# Patient Record
Sex: MALE | Race: OTHER | NOT HISPANIC OR LATINO | URBAN - METROPOLITAN AREA
[De-identification: names, ages, dates, MRNs, and addresses within clinical notes are randomized per-mention and may not be internally consistent; named-entity substitution may affect disease eponyms.]

---

## 2019-05-28 ENCOUNTER — EMERGENCY (EMERGENCY)
Facility: HOSPITAL | Age: 54
LOS: 1 days | Discharge: HOME | End: 2019-05-28
Attending: EMERGENCY MEDICINE
Payer: SUBSIDIZED

## 2019-05-28 VITALS
DIASTOLIC BLOOD PRESSURE: 86 MMHG | RESPIRATION RATE: 17 BRPM | TEMPERATURE: 97 F | HEART RATE: 86 BPM | WEIGHT: 179.9 LBS | OXYGEN SATURATION: 99 % | SYSTOLIC BLOOD PRESSURE: 188 MMHG

## 2019-05-28 DIAGNOSIS — M79.602 PAIN IN LEFT ARM: ICD-10-CM

## 2019-05-28 DIAGNOSIS — I10 ESSENTIAL (PRIMARY) HYPERTENSION: ICD-10-CM

## 2019-05-28 DIAGNOSIS — R07.9 CHEST PAIN, UNSPECIFIED: ICD-10-CM

## 2019-05-28 PROCEDURE — 99053 MED SERV 10PM-8AM 24 HR FAC: CPT

## 2019-05-28 PROCEDURE — 99284 EMERGENCY DEPT VISIT MOD MDM: CPT | Mod: 25

## 2019-05-28 PROCEDURE — 93010 ELECTROCARDIOGRAM REPORT: CPT

## 2019-05-28 NOTE — ED PROVIDER NOTE - CLINICAL SUMMARY MEDICAL DECISION MAKING FREE TEXT BOX
54 yo male presents to ER for left sided CP that occurred PTA and last up to 15 min. +radiation down left upper arm. EKG ok. Pt refusing labs or a workup in the ER as he states his CP gone now and he doesn't want to wait for a workup. Explained to him he has several risk factors for ACS and with his CC it is strongly recommended to do labs to check for cardiac enzymes. Pt refusing and signed out AMA, states he will see his own doctor later today.

## 2019-05-28 NOTE — ED PROVIDER NOTE - CARE PROVIDER_API CALL
Zane Campa)  Cardiovascular Disease; Interventional Cardiology  [address] [place] [address]  Phone: [phone]  Fax: [phone]  Follow Up Time:

## 2019-05-28 NOTE — ED PROVIDER NOTE - PHYSICAL EXAMINATION
VITAL SIGNS: I have reviewed nursing notes and confirm.  CONSTITUTIONAL: Well-developed; well-nourished; in no acute distress.  SKIN: Skin exam is warm and dry, no acute rash.  HEAD: Normocephalic; atraumatic.  EYES: PERRL, EOM intact; conjunctiva and sclera clear.  ENT: No nasal discharge; airway clear.   NECK: Supple; non tender.  CARD: S1, S2 normal; no murmurs, gallops, or rubs. Regular rate and rhythm.  CHEST no chest wall pain, and no pain with ROM exercises of LUE  RESP: No wheezes, rales or rhonchi.  ABD: Normal bowel sounds; soft; non-distended; non-tender; no hepatosplenomegaly.  EXT: Normal ROM. No clubbing, cyanosis or edema.  LYMPH: No acute cervical adenopathy.  NEURO: Alert, oriented. Grossly unremarkable. No focal deficits.  PSYCH: Cooperative, appropriate.

## 2019-05-28 NOTE — ED ADULT TRIAGE NOTE - CHIEF COMPLAINT QUOTE
pt c/o L sided c pain radiating to L arm which started 30 mins ago. pt stated he was driving when it happened. pt denies any aggregating or relieving factors. pt denies any cp at this time.

## 2019-05-28 NOTE — ED PROVIDER NOTE - PROGRESS NOTE DETAILS
I had extensive discussion of risks and benefits of pursuing further medical evaluation and/or care with patient and any available family/friends, including but not limited to worsening of clinical status, disability, and death; patient still electing to leave against medical advice. Patient is awake, alert, oriented and demonstrates full capacity and insight into illness. Patient aware and encouraged to return immediately to this ED or nearest ED if patient decides to change mind regarding care or if patient experiences any new, worsening, or concerning symptoms. Any available test results were discussed with patient and/or family. Verbal instructions given, patient endorsed understanding. Written discharge instructions additionally given, including follow-up plan.

## 2019-05-28 NOTE — ED PROVIDER NOTE - OBJECTIVE STATEMENT
52 yo male with pmh of HTN presents to the ER for left sided CP today with radiation down the left upper arm PTA. States lasted about 10-15 min then resolved upon arrival to ER. States he's been working as , and was fasting all day. Skipped his BP meds and that is why BP high now. Pt denies any SOB/N/V/sweats/abdomen pain/back pain/leg swelling or calf pain. Pt states he thought maybe he may have pulled a muscle while driving. No diarrhea/ complaints. States his primary did a stress test and ECHO a couple months ago in his office and it was all ok    PMH as above  Meds can recall names  ALL: nkda  SH: no smoking or etoh  PMD Sayad,  FH neg